# Patient Record
Sex: FEMALE | Race: ASIAN | NOT HISPANIC OR LATINO
[De-identification: names, ages, dates, MRNs, and addresses within clinical notes are randomized per-mention and may not be internally consistent; named-entity substitution may affect disease eponyms.]

---

## 2018-05-07 PROBLEM — Z00.00 ENCOUNTER FOR PREVENTIVE HEALTH EXAMINATION: Status: ACTIVE | Noted: 2018-05-07

## 2018-05-17 ENCOUNTER — APPOINTMENT (OUTPATIENT)
Dept: ORTHOPEDIC SURGERY | Facility: CLINIC | Age: 53
End: 2018-05-17
Payer: MEDICAID

## 2018-05-17 VITALS
HEART RATE: 76 BPM | BODY MASS INDEX: 31.65 KG/M2 | WEIGHT: 190 LBS | HEIGHT: 65 IN | SYSTOLIC BLOOD PRESSURE: 119 MMHG | DIASTOLIC BLOOD PRESSURE: 85 MMHG

## 2018-05-17 PROCEDURE — 99204 OFFICE O/P NEW MOD 45 MIN: CPT

## 2018-05-17 PROCEDURE — 72100 X-RAY EXAM L-S SPINE 2/3 VWS: CPT

## 2018-05-31 ENCOUNTER — TRANSCRIPTION ENCOUNTER (OUTPATIENT)
Age: 53
End: 2018-05-31

## 2018-06-13 ENCOUNTER — APPOINTMENT (OUTPATIENT)
Dept: ORTHOPEDIC SURGERY | Facility: CLINIC | Age: 53
End: 2018-06-13
Payer: MEDICAID

## 2018-06-13 DIAGNOSIS — Z78.9 OTHER SPECIFIED HEALTH STATUS: ICD-10-CM

## 2018-06-13 PROCEDURE — 99214 OFFICE O/P EST MOD 30 MIN: CPT

## 2018-07-11 ENCOUNTER — APPOINTMENT (OUTPATIENT)
Dept: SPINE | Facility: CLINIC | Age: 53
End: 2018-07-11
Payer: MEDICAID

## 2018-07-11 VITALS
SYSTOLIC BLOOD PRESSURE: 124 MMHG | BODY MASS INDEX: 30.53 KG/M2 | WEIGHT: 190 LBS | HEIGHT: 66 IN | DIASTOLIC BLOOD PRESSURE: 74 MMHG

## 2018-07-11 DIAGNOSIS — G95.19 OTHER VASCULAR MYELOPATHIES: ICD-10-CM

## 2018-07-11 PROCEDURE — 99203 OFFICE O/P NEW LOW 30 MIN: CPT

## 2018-07-11 RX ORDER — METHYLPREDNISOLONE 4 MG/1
4 TABLET ORAL
Qty: 21 | Refills: 0 | Status: DISCONTINUED | COMMUNITY
Start: 2018-05-17 | End: 2018-07-11

## 2018-07-11 RX ORDER — NAPROXEN 500 MG/1
500 TABLET ORAL
Qty: 30 | Refills: 0 | Status: DISCONTINUED | COMMUNITY
Start: 2018-05-09 | End: 2018-07-11

## 2018-07-11 RX ORDER — FLUTICASONE PROPIONATE 50 UG/1
50 POWDER, METERED RESPIRATORY (INHALATION)
Qty: 60 | Refills: 0 | Status: DISCONTINUED | COMMUNITY
Start: 2018-01-02 | End: 2018-07-11

## 2018-07-11 RX ORDER — METHYLPREDNISOLONE 4 MG/1
4 TABLET ORAL
Qty: 1 | Refills: 1 | Status: DISCONTINUED | COMMUNITY
Start: 2018-05-17 | End: 2018-07-11

## 2018-07-11 RX ORDER — IBUPROFEN 200 MG/1
TABLET, COATED ORAL
Refills: 0 | Status: ACTIVE | COMMUNITY

## 2018-07-11 RX ORDER — ACETAMINOPHEN 325 MG/1
TABLET, FILM COATED ORAL
Refills: 0 | Status: ACTIVE | COMMUNITY

## 2018-07-11 RX ORDER — PROMETHAZINE HYDROCHLORIDE AND DEXTROMETHORPHAN HYDROBROMIDE ORAL SOLUTION 15; 6.25 MG/5ML; MG/5ML
6.25-15 SOLUTION ORAL
Qty: 118 | Refills: 0 | Status: DISCONTINUED | COMMUNITY
Start: 2018-01-02 | End: 2018-07-11

## 2020-12-15 ENCOUNTER — APPOINTMENT (OUTPATIENT)
Dept: NEUROLOGY | Facility: CLINIC | Age: 55
End: 2020-12-15
Payer: COMMERCIAL

## 2020-12-15 VITALS
WEIGHT: 190 LBS | DIASTOLIC BLOOD PRESSURE: 75 MMHG | HEART RATE: 78 BPM | SYSTOLIC BLOOD PRESSURE: 126 MMHG | BODY MASS INDEX: 30.53 KG/M2 | HEIGHT: 66 IN

## 2020-12-15 DIAGNOSIS — U07.1 COVID-19: ICD-10-CM

## 2020-12-15 DIAGNOSIS — R51.9 HEADACHE, UNSPECIFIED: ICD-10-CM

## 2020-12-15 PROCEDURE — 99205 OFFICE O/P NEW HI 60 MIN: CPT

## 2020-12-15 PROCEDURE — 99072 ADDL SUPL MATRL&STAF TM PHE: CPT

## 2020-12-15 RX ORDER — AMOXICILLIN 500 MG/1
500 CAPSULE ORAL
Qty: 30 | Refills: 0 | Status: COMPLETED | COMMUNITY
Start: 2020-11-21

## 2020-12-15 RX ORDER — NAPROXEN 375 MG/1
375 TABLET ORAL
Qty: 60 | Refills: 0 | Status: ACTIVE | COMMUNITY
Start: 2020-11-21

## 2020-12-15 RX ORDER — CIPROFLOXACIN HYDROCHLORIDE 500 MG/1
500 TABLET, FILM COATED ORAL
Qty: 20 | Refills: 0 | Status: DISCONTINUED | COMMUNITY
Start: 2020-12-05

## 2020-12-15 RX ORDER — CYCLOBENZAPRINE HYDROCHLORIDE 5 MG/1
5 TABLET, FILM COATED ORAL
Qty: 60 | Refills: 1 | Status: ACTIVE | COMMUNITY
Start: 2020-12-15 | End: 1900-01-01

## 2020-12-15 RX ORDER — AZITHROMYCIN 250 MG/1
250 TABLET, FILM COATED ORAL
Qty: 6 | Refills: 0 | Status: COMPLETED | COMMUNITY
Start: 2020-12-10

## 2020-12-15 RX ORDER — CETIRIZINE HYDROCHLORIDE 10 MG/1
10 TABLET, COATED ORAL
Qty: 30 | Refills: 0 | Status: DISCONTINUED | COMMUNITY
Start: 2020-11-21

## 2020-12-15 NOTE — ASSESSMENT
[FreeTextEntry1] : Assessment/Plan:\par  55 year female presents with new onset headaches since being diagnosed with COVID-19 infection 1 month ago. She denies any vision changes, or body weakness or numbness. CT head in ED 12/9 was normal. Her headaches have been improving on medrol dose pack. Neurological exam normal. \par \par I suspect she has a post viral headaches. Presentation not consistent with migraines or meningitis. \par \par [] Will order MRI brain w.w.o to rule secondary causes of new onset headache\par [] Will prescribe flexeril 5-10 mg as needed at bedtime, for muscular component of HA\par [] Continue symptomatic management of HA with OTC pain medications\par \par Headache education provided:\par [] Stay well hydrated\par [] Limit excessive caffeine and alcohol intake\par [] Maintain good sleep hygiene\par [] Try to avoid triggers\par [] Practice good eating habits\par [] Avoid excessive use of over the counter pain medications, as they can cause medication overuse headaches \par [] Keep a headache diary\par \par \par RTC 3 weeks\par \par The above plan was discussed with BEATRIZ SMITH in great detail. BEATRIZ SMITH was provided education and counselling on current diagnosis/symptoms, diagnostic work up, treatment options and potential side effects of prescribed therapy/therapies. She was advised to call our clinic at [phone] for any new or worsening symptoms, or with any questions or concerns. BEATRIZ LUIS expressed understanding and all her questions were answered.\par

## 2020-12-15 NOTE — HISTORY OF PRESENT ILLNESS
[FreeTextEntry1] : Rosalia reports she was diagnosed with COVID-19 1 month ago, re tested 12/9 and was negative. \par \par She is accompanied by her daughter in clinic.\par \par She says she has been having headaches since COVID-19. She reports pressure headache and "head feeling heavy" and like a "head cold". She denies any prior hx of headaches. \par \par She reports the headaches can get severe. She was seen in ED 12/9 for her headaches at [facility]. CT head was normal. CT chest showed atypical viral pneumonia. \par \par She denies any vision changes, diplopia, weakness or numbness/paresthesias. \par \par She denies any nausea or light sensitivity. She denies any confusion. No seizures. \par \par She denies any fevers. She reports her shoulder muscles and neck muscles feel a little tense. \par \par She also endorses poor sleep.\par \par She was started on medrol dose pack 6 days ago and reports symptoms have improved,, less often and less intense.\par \par

## 2020-12-16 ENCOUNTER — APPOINTMENT (OUTPATIENT)
Dept: NEUROLOGY | Facility: CLINIC | Age: 55
End: 2020-12-16

## 2021-01-14 ENCOUNTER — APPOINTMENT (OUTPATIENT)
Dept: NEUROLOGY | Facility: CLINIC | Age: 56
End: 2021-01-14

## 2021-05-10 ENCOUNTER — APPOINTMENT (OUTPATIENT)
Dept: DISASTER EMERGENCY | Facility: OTHER | Age: 56
End: 2021-05-10
Payer: COMMERCIAL

## 2021-05-10 PROCEDURE — 0012A: CPT

## 2021-09-13 ENCOUNTER — TRANSCRIPTION ENCOUNTER (OUTPATIENT)
Age: 56
End: 2021-09-13

## 2021-10-06 PROBLEM — G95.19 NEUROGENIC CLAUDICATION: Status: ACTIVE | Noted: 2018-07-11

## 2022-02-15 ENCOUNTER — APPOINTMENT (OUTPATIENT)
Dept: ORTHOPEDIC SURGERY | Facility: CLINIC | Age: 57
End: 2022-02-15

## 2022-02-23 ENCOUNTER — APPOINTMENT (OUTPATIENT)
Dept: ORTHOPEDIC SURGERY | Facility: CLINIC | Age: 57
End: 2022-02-23
Payer: OTHER MISCELLANEOUS

## 2022-02-23 VITALS
OXYGEN SATURATION: 98 % | BODY MASS INDEX: 29.25 KG/M2 | HEART RATE: 76 BPM | WEIGHT: 182 LBS | HEIGHT: 66 IN | DIASTOLIC BLOOD PRESSURE: 85 MMHG | SYSTOLIC BLOOD PRESSURE: 121 MMHG

## 2022-02-23 DIAGNOSIS — M47.812 SPONDYLOSIS W/OUT MYELOPATHY OR RADICULOPATHY, CERVICAL REGION: ICD-10-CM

## 2022-02-23 PROCEDURE — 99204 OFFICE O/P NEW MOD 45 MIN: CPT

## 2022-02-23 PROCEDURE — 72040 X-RAY EXAM NECK SPINE 2-3 VW: CPT

## 2022-02-23 PROCEDURE — 73030 X-RAY EXAM OF SHOULDER: CPT | Mod: RT

## 2022-02-23 RX ORDER — CYCLOBENZAPRINE HYDROCHLORIDE 5 MG/1
5 TABLET, FILM COATED ORAL
Qty: 28 | Refills: 0 | Status: ACTIVE | COMMUNITY
Start: 2022-02-23 | End: 1900-01-01

## 2022-02-23 RX ORDER — PREDNISONE 50 MG/1
50 TABLET ORAL DAILY
Qty: 5 | Refills: 0 | Status: ACTIVE | COMMUNITY
Start: 2022-02-23 | End: 1900-01-01

## 2022-03-06 ENCOUNTER — FORM ENCOUNTER (OUTPATIENT)
Age: 57
End: 2022-03-06

## 2022-03-08 ENCOUNTER — OUTPATIENT (OUTPATIENT)
Dept: OUTPATIENT SERVICES | Facility: HOSPITAL | Age: 57
LOS: 1 days | End: 2022-03-08
Payer: SELF-PAY

## 2022-03-08 ENCOUNTER — APPOINTMENT (OUTPATIENT)
Dept: MRI IMAGING | Facility: CLINIC | Age: 57
End: 2022-03-08
Payer: SELF-PAY

## 2022-03-08 DIAGNOSIS — M47.812 SPONDYLOSIS WITHOUT MYELOPATHY OR RADICULOPATHY, CERVICAL REGION: ICD-10-CM

## 2022-03-08 DIAGNOSIS — R51.9 HEADACHE, UNSPECIFIED: ICD-10-CM

## 2022-03-08 PROCEDURE — 72141 MRI NECK SPINE W/O DYE: CPT | Mod: 26

## 2022-03-08 PROCEDURE — 72141 MRI NECK SPINE W/O DYE: CPT

## 2022-03-14 ENCOUNTER — APPOINTMENT (OUTPATIENT)
Dept: ORTHOPEDIC SURGERY | Facility: CLINIC | Age: 57
End: 2022-03-14
Payer: COMMERCIAL

## 2022-03-14 VITALS
SYSTOLIC BLOOD PRESSURE: 118 MMHG | HEIGHT: 66 IN | DIASTOLIC BLOOD PRESSURE: 80 MMHG | WEIGHT: 182 LBS | BODY MASS INDEX: 29.25 KG/M2

## 2022-03-14 PROCEDURE — 72110 X-RAY EXAM L-2 SPINE 4/>VWS: CPT

## 2022-03-14 PROCEDURE — 99214 OFFICE O/P EST MOD 30 MIN: CPT

## 2022-03-14 NOTE — ASSESSMENT
[FreeTextEntry1] : I had a lengthy discussion with the patient in regards to their treatment plan and diagnosis.  Their symptoms have persisted despite the conservative management they have attempted thus far.  As a result I would like to proceed with a lumbar MRI.  In tandem with this they should begin physical therapy/home therapy program.  The patient can take Tylenol/NSAIDs as needed for pain control if medically able to.  I will have the patient follow-up in 3 to 4 weeks for repeat clinical evaluation.  I encouraged them to follow-up sooner if their symptoms worsen or change in any way.\par \par The patient has tried the following treatments:\par Activity modification          +\par Ice/Compression                  +\par Braces                                     -\par NSAIDS                                   +\par Physical Therapy                   +\par \par Please note above modalities have been tried for over 6+ weeks.

## 2022-03-14 NOTE — HISTORY OF PRESENT ILLNESS
[de-identified] : 57 yo female presents with 5 year history of diffuse low back pain with bilateral radicular symptoms along the lateral aspects of both her thighs down to her ankles. She states the right side is worse than the left. Patient states she has been evaluated by multiple doctors and has done physical therapy, and 3 epidural spinal injections, last injection was over 3 years ago. She states the therapy and injections have not helped her. Her last MRI was  a year ago. She states she has the most discomfort when she sits. Her pain improves when she walks or lays down. She also takes Tylenol which also seems to help her as well. She denies any bowel or bladder dysfunction or saddle anesthesia.

## 2022-03-14 NOTE — PHYSICAL EXAM
[de-identified] : Lumbar Physical Exam\par \par Gait - Normal\par \par Station - Normal\par \par Sagittal balance - Normal\par \par Compensatory mechanism? - None\par \par Heel walk - Normal\par \par Toe walk - Normal\par \par Reflexes\par Patellar - normal\par Gastroc - normal\par Clonus - No\par \par Hip Exam - Normal\par \par Straight leg raise - none\par \par Pulses - 2+ dp/pt\par \par Range of motion - normal\par \par Sensation \par Sensation intact to light touch in L1, L2, L3, L4, L5 and S1 dermatomes bilaterally\par \par Motor\par 	IP	Quad	HS	TA	Gastroc	EHL\par Right	5/5	5/5	5/5	5/5	5/5	5/5\par Left	5/5	5/5	5/5	5/5	5/5	5/5 [de-identified] : Lumbar radiographs\par Facet arthropathy noted\par Disc degeneration noted

## 2022-03-15 ENCOUNTER — FORM ENCOUNTER (OUTPATIENT)
Age: 57
End: 2022-03-15

## 2022-03-21 ENCOUNTER — APPOINTMENT (OUTPATIENT)
Dept: ORTHOPEDIC SURGERY | Facility: CLINIC | Age: 57
End: 2022-03-21
Payer: OTHER MISCELLANEOUS

## 2022-03-21 VITALS
WEIGHT: 180 LBS | BODY MASS INDEX: 28.93 KG/M2 | HEART RATE: 69 BPM | OXYGEN SATURATION: 100 % | DIASTOLIC BLOOD PRESSURE: 80 MMHG | HEIGHT: 66 IN | SYSTOLIC BLOOD PRESSURE: 120 MMHG

## 2022-03-21 PROCEDURE — 99214 OFFICE O/P EST MOD 30 MIN: CPT | Mod: 25

## 2022-03-21 PROCEDURE — 20610 DRAIN/INJ JOINT/BURSA W/O US: CPT | Mod: RT

## 2022-03-21 PROCEDURE — 99072 ADDL SUPL MATRL&STAF TM PHE: CPT

## 2022-03-21 RX ORDER — MELOXICAM 15 MG/1
15 TABLET ORAL DAILY
Qty: 14 | Refills: 1 | Status: ACTIVE | COMMUNITY
Start: 2022-03-21 | End: 1900-01-01

## 2022-03-22 ENCOUNTER — OUTPATIENT (OUTPATIENT)
Dept: OUTPATIENT SERVICES | Facility: HOSPITAL | Age: 57
LOS: 1 days | End: 2022-03-22
Payer: COMMERCIAL

## 2022-03-22 ENCOUNTER — APPOINTMENT (OUTPATIENT)
Dept: MRI IMAGING | Facility: CLINIC | Age: 57
End: 2022-03-22
Payer: COMMERCIAL

## 2022-03-22 DIAGNOSIS — M54.16 RADICULOPATHY, LUMBAR REGION: ICD-10-CM

## 2022-03-22 DIAGNOSIS — Z00.8 ENCOUNTER FOR OTHER GENERAL EXAMINATION: ICD-10-CM

## 2022-03-22 PROCEDURE — 72148 MRI LUMBAR SPINE W/O DYE: CPT | Mod: 26

## 2022-03-22 PROCEDURE — 72148 MRI LUMBAR SPINE W/O DYE: CPT

## 2022-04-05 ENCOUNTER — APPOINTMENT (OUTPATIENT)
Dept: ORTHOPEDIC SURGERY | Facility: CLINIC | Age: 57
End: 2022-04-05
Payer: OTHER MISCELLANEOUS

## 2022-04-05 PROCEDURE — 99072 ADDL SUPL MATRL&STAF TM PHE: CPT

## 2022-04-05 PROCEDURE — 99214 OFFICE O/P EST MOD 30 MIN: CPT

## 2022-04-05 PROCEDURE — 73020 X-RAY EXAM OF SHOULDER: CPT | Mod: RT

## 2022-04-05 RX ORDER — DICLOFENAC SODIUM 75 MG/1
75 TABLET, DELAYED RELEASE ORAL
Qty: 60 | Refills: 1 | Status: COMPLETED | COMMUNITY
Start: 2022-04-05 | End: 2022-06-04

## 2022-04-05 RX ORDER — CYCLOBENZAPRINE HYDROCHLORIDE 5 MG/1
5 TABLET, FILM COATED ORAL
Qty: 20 | Refills: 0 | Status: COMPLETED | COMMUNITY
Start: 2022-04-05 | End: 2022-04-25

## 2022-04-05 NOTE — PHYSICAL EXAM
[de-identified] : Physical Examination\par General: well nourished, in no acute distress, alert and oriented to person, place and time\par Psychiatric: normal mood and affect, no abnormal movements or speech patterns\par Eyes: vision intact without glasses\par \par Musculoskeletal Examination\par Cervical spine	Full painless range of motion and negative Spurling's test\par \par Shoulder			Right			Left\par Appearance\par      Skin/Swelling/Deformity	prominent SC joint			normal\par      Scapular Winging		-			-\par Range of Motion\par      Forward Flexion		120 / 150		170 / 170\par      Abduction			70 / 120		170 / 170\par      External Rotation		60			60\par      Internal Rotation		lateral hip			T10\par      SAbd Ext Rotation		70			90\par      SAbd Int Rotation		40			80\par      Painful Arc			+			-\par      Crepitus			-			-\par Palpation\par      Clavicle			+ medial SC joint			-\par      AC Joint			-			-\par      Posterior Acromion		+			-\par      Levator Scapula		+			-\par      Lateral Bursa			+			-\par      Impingement Area		+			-\par      Biceps Tendon		+			-\par      Anterior Capsule		+			-\par right chest wall\par Strength Examination\par      Supraspinatous 		4+ / +			5+ / 0\par      Infraspinatous			4+ / +			5+ / 0\par      Subscapularis			5+ / 0			5+ / 0\par      Belly Press			5+ / 0			5+ / 0\par      Lift Off			-			-\par      Drop-Arm			-			-\par Special Examination\par      Biceps [place]'s		+			-\par      Impingement Neer		+			-\par      Impingement Hawking		+			-\par \par Sensation\par      Axillary			normal			normal\par      LatAntCubBrach 		normal			normal\par      Median 			normal			normal\par      Ulnar 			normal			normal\par      Radial 			normal			normal\par Motor\par      AIN 				normal			normal\par      Ulnar 			normal			normal\par      Radial 			normal			normal\par      PIN 				normal			normal\par Pulses\par      Radial			2+			2+\par  [de-identified] : 3 views of the affected XXX shoulder Glenoid, Y-View, Axillary)\par Dated 2-23-22 and evaluated by myself today\par 1  views of the affected XXX shoulder (AP)\par were ordered, obtained and evaluated by myself today and\par demonstrate:\par normal bony calcification without dislocation and no fracture\par 	Arch	[2B]\par 	AC Joint	[No] Arthrosis but appears wide\par 	GH Joint	[No] Arthrosis\par 	Calcifications	[none]\par \par

## 2022-04-05 NOTE — ASSESSMENT
[Indicate if, in your opinion, the incident that the patient described was the competent medical cause of this injury/illness.] : The incident that the patient described was the competent medical cause of this injury/illness: Yes [Indicate if the patient's complaints are consistent with his/her history of the injury/illness.] : Indicate if the patient's complaints are consistent with his/her history of the injury/illness: Yes [Yes] : Yes, it is consistent [Physical Disability Temporary Total] : temporarily total disabled [Can the patient return to usual work activities as indicated? If yes, indicate date___] : The patient cannot return to usual work activities as indicated. [FreeTextEntry5] : 100

## 2022-04-05 NOTE — DISCUSSION/SUMMARY
[de-identified] : right shoulder rotator cuff syndrome\par \par I discussed my findings on history, exam and radiology.\par \par I reviewed the anatomy and function of the shoulder rotator cuff muscles and tendons, biceps tendon and labrum.Given the current findings for the patient, I recommend proceeding with advanced imaging to better characterize and diagnose the problem to aid patient care, management and treatment guidance as I suspect an internal injury to the knee not diagnosed on non-diagnostic plain radiographs causing the patients symptoms and physical examination to help provide surgical management planning.\par \par Therefore given the patients continued symptoms despite [ Non-operative management ] the patient has continued pain and weakness and impaired sleep affecting the patient's ADLs and limiting activities negatively affecting the patient's quality of life, examination and history consistent with internal shoulder derangement or rotator cuff injury with weakness of the rotator cuff muscles along with severe pain I am recommending obtaining advanced MRI imaging of the shoulder to identify damaged structures in order to facilite preopertive planning. I discussed with the patient that I am ordering an MRI to assess the soft tissue structures in the joint such as the joint capsule, ligaments, rotator cuff and biceps tendons, as well as to assess the cartilage, muscle bellies, cysts, AC joint edema or other pathology. The test will need insurance approval and will take place at a [facility] or outside facility. If the patient elects to obtain the MRI from an outside facility, the patient understands they have been instructed to bring in both the final radiologist read and a CD/DVD with the MRI images to allow review of the imaging test by myself during the follow up visit. I do not recommend obtaining an open standing MRI as the quality of the images is subpar and makes it difficult to diagnose intra-articular derangements and guide care discussion and decision making.\par \par The patient was prescribed Diclofenac PO non-steroidal anti-inflammatory medication. 50mg tablets twice daily to be taken for at least 1-2 weeks in a row and then PRN afterwards. Risks and benefits were discussed and include but not limited to renal damage and GI ulceration and bleeding.  They were advised to take with food to limit stomach upset as well as warned to stop the medication if worsening gastric pain or dizziness or other side effects. Also to immediately stop the medication and seek appropriate medical attention if any severe stomach ache, gastritis, black/red vomit, black/red stools or any other medical concern.\par \par The patient verifies their understanding the the visit, diagnosis and plan. They agree with the treatment plan and will contact the office with any questions or problems.\par \par FU after MRI completed

## 2022-04-05 NOTE — HISTORY OF PRESENT ILLNESS
[Has the patient missed work because of the injury/illness?] : The patient has missed work because of the injury/illness. [No] : The patient is currently not working. [de-identified] : CC RIGHTLEFT shoulder\par \par HPI 56 y RHF presents with acute onset 6 weeks pain sc and lateral and neck and posterior right shoulder lifting heavy object. \par The pain is WORSE, and rated a 10 out of 10, described as severe WITH radiation\par nothing makes the pain better and everything makes the pain worse.\par The patient reports associated symptoms of hand tingling\par The patient DENIES pain at night affecting sleep, DENIES neck pain, and DENIES similar pain previously.\par \par The patient has tried the following treatments:\par Activity modification	+\par Ice			+\par Nsaids    		+\par Physical Therapy  	-\par Cortisone Injection	+ subacromial 3-21-22 several days help\par Arthroscopy/Surgery	-\par \par Review of Systems is positive for the above musculoskeletal symptoms and is otherwise non-contributory for general, constitutional, psychiatric, neurologic, HEENT, cardiac, respiratory, gastrointestinal, reproductive, lymphatic, and dermatologic complaints.\par \par Consult by Dr MARTINEZ\par \par  [FreeTextEntry1] : pain and limited motion and strength 2 mo after lifting heavy object [FreeTextEntry2] : HHA began early feb [FreeTextEntry3] : self care, cleaning, cooking [FreeTextEntry4] : CSI several days improvement 3-21-22 [FreeTextEntry6] : 2-23-22

## 2022-04-06 ENCOUNTER — FORM ENCOUNTER (OUTPATIENT)
Age: 57
End: 2022-04-06

## 2022-04-08 ENCOUNTER — NON-APPOINTMENT (OUTPATIENT)
Age: 57
End: 2022-04-08

## 2022-04-11 ENCOUNTER — APPOINTMENT (OUTPATIENT)
Dept: MRI IMAGING | Facility: CLINIC | Age: 57
End: 2022-04-11
Payer: OTHER MISCELLANEOUS

## 2022-04-11 ENCOUNTER — OUTPATIENT (OUTPATIENT)
Dept: OUTPATIENT SERVICES | Facility: HOSPITAL | Age: 57
LOS: 1 days | End: 2022-04-11
Payer: COMMERCIAL

## 2022-04-11 ENCOUNTER — APPOINTMENT (OUTPATIENT)
Dept: ORTHOPEDIC SURGERY | Facility: CLINIC | Age: 57
End: 2022-04-11
Payer: COMMERCIAL

## 2022-04-11 VITALS
HEIGHT: 66 IN | HEART RATE: 68 BPM | DIASTOLIC BLOOD PRESSURE: 87 MMHG | WEIGHT: 180 LBS | OXYGEN SATURATION: 98 % | BODY MASS INDEX: 28.93 KG/M2 | SYSTOLIC BLOOD PRESSURE: 129 MMHG

## 2022-04-11 DIAGNOSIS — Z00.8 ENCOUNTER FOR OTHER GENERAL EXAMINATION: ICD-10-CM

## 2022-04-11 DIAGNOSIS — M75.101 UNSPECIFIED ROTATOR CUFF TEAR OR RUPTURE OF RIGHT SHOULDER, NOT SPECIFIED AS TRAUMATIC: ICD-10-CM

## 2022-04-11 PROCEDURE — 73221 MRI JOINT UPR EXTREM W/O DYE: CPT | Mod: 26,RT

## 2022-04-11 PROCEDURE — 99214 OFFICE O/P EST MOD 30 MIN: CPT

## 2022-04-11 PROCEDURE — 73221 MRI JOINT UPR EXTREM W/O DYE: CPT

## 2022-04-11 NOTE — HISTORY OF PRESENT ILLNESS
[de-identified] : Today the patient states that she is still having back pain.  She also describes pain which does radiate down her right leg.  She denies any bowel bladder issues.  She denies any saddle anesthesia.  She does state that the symptoms can be disabling at times.

## 2022-04-11 NOTE — PHYSICAL EXAM
[de-identified] : Lumbar Physical Exam\par \par Gait - Normal\par \par Station - Normal\par \par Sagittal balance - Normal\par \par Compensatory mechanism? - None\par \par Heel walk - Normal\par \par Toe walk - Normal\par \par Reflexes\par Patellar - normal\par Gastroc - normal\par Clonus - No\par \par Hip Exam - Normal\par \par Straight leg raise - none\par \par Pulses - 2+ dp/pt\par \par Range of motion - normal\par \par Sensation \par Sensation intact to light touch in L1, L2, L3, L4, L5 and S1 dermatomes bilaterally\par \par Motor\par 	IP	Quad	HS	TA	Gastroc	EHL\par Right	5/5	5/5	5/5	5/5	5/5	5/5\par Left	5/5	5/5	5/5	5/5	5/5	5/5 [de-identified] : Lumbar radiographs\par Facet arthropathy noted\par Disc degeneration noted\par \par Lumbar MRI reviewed\par No areas of critical central stenosis\par No areas of critical foraminal stenosis

## 2022-05-10 ENCOUNTER — APPOINTMENT (OUTPATIENT)
Dept: ORTHOPEDIC SURGERY | Facility: CLINIC | Age: 57
End: 2022-05-10
Payer: OTHER MISCELLANEOUS

## 2022-05-10 VITALS — SYSTOLIC BLOOD PRESSURE: 114 MMHG | DIASTOLIC BLOOD PRESSURE: 56 MMHG | HEART RATE: 76 BPM

## 2022-05-10 PROCEDURE — 99214 OFFICE O/P EST MOD 30 MIN: CPT

## 2022-05-10 PROCEDURE — 99072 ADDL SUPL MATRL&STAF TM PHE: CPT

## 2022-05-10 RX ORDER — DICLOFENAC SODIUM 50 MG/1
50 TABLET, DELAYED RELEASE ORAL
Qty: 60 | Refills: 1 | Status: ACTIVE | COMMUNITY
Start: 2022-05-10 | End: 1900-01-01

## 2022-05-10 NOTE — PHYSICAL EXAM
[de-identified] : Physical Examination\par General: well nourished, in no acute distress, alert and oriented to person, place and time\par Psychiatric: normal mood and affect, no abnormal movements or speech patterns\par Eyes: vision intact without glasses\par \par Musculoskeletal Examination\par Cervical spine	Full painless range of motion and negative Spurling's test\par \par Shoulder			Right			Left\par Appearance\par      Skin/Swelling/Deformity	prominent SC joint			normal\par      Scapular Winging		-			-\par Range of Motion\par      Forward Flexion		120 / 150		170 / 170\par      Abduction			70 / 120		170 / 170\par      External Rotation		60			60\par      Internal Rotation		lateral hip			T10\par      SAbd Ext Rotation		70			90\par      SAbd Int Rotation		40			80\par      Painful Arc			+			-\par      Crepitus			-			-\par Palpation\par      Clavicle			+ medial SC joint			-\par      AC Joint			-			-\par      Posterior Acromion		+			-\par      Levator Scapula		+			-\par      Lateral Bursa			+			-\par      Impingement Area		+			-\par      Biceps Tendon		+			-\par      Anterior Capsule		+			-\par right chest wall\par Strength Examination\par      Supraspinatous 		4+ / +			5+ / 0\par      Infraspinatous			4+ / +			5+ / 0\par      Subscapularis			5+ / 0			5+ / 0\par      Belly Press			5+ / 0			5+ / 0\par      Lift Off			-			-\par      Drop-Arm			-			-\par Special Examination\par      Biceps [place]'s		+			-\par      Impingement Neer		+			-\par      Impingement Hawking		+			-\par \par Sensation\par      Axillary			normal			normal\par      LatAntCubBrach 		normal			normal\par      Median 			normal			normal\par      Ulnar 			normal			normal\par      Radial 			normal			normal\par Motor\par      AIN 				normal			normal\par      Ulnar 			normal			normal\par      Radial 			normal			normal\par      PIN 				normal			normal\par Pulses\par      Radial			2+			2+\par  [de-identified] : 3 views of the affected Binduer Glenoid, Y-View, Axillary)\par Dated 2-23-22 \par 1  views of the affected Pranaylder (AP)\par 4-5-22\par demonstrate:\par normal bony calcification without dislocation and no fracture\par 	Arch	[2B]\par 	AC Joint	[No] Arthrosis but appears wide\par 	GH Joint	[No] Arthrosis\par 	Calcifications	[none]\par \par \par MRI Left shoulder [facility] on 4-14-22\par My impression of the images:\par Quality of the MRI is good\par Supraspinatous Tendon mild signal\par Infraspinatous Tendon mild signal\par Subscapularis Tendon ok\par Teres Minor Tendon ok\par Muscle Belly Atrophy none\par Biceps Tendon is  in the groove and looks ok intra-articularly but poorly visualized with a stable attachment anchor\par Superior Labrum ok\par Anterior Labrum ok\par Posterior Labrum ok\par AC joint none\par There is no full thickness chondral lesion of the glenoid and humeral head\par \par The Final Radiologist Impression:\par LOCALIZER: No additional findings.\par \par Glenohumeral joint: There is cystic change in the humeral head at the infraspinatus insertion. There is no fracture or AVN. There is no substantial effusion.\par \par Acromioclavicular joint: There is widening of the acromioclavicular interval measuring 1 cm with partial attenuation of the AC joint ligaments consistent with partial tear. There is no significant adjacent soft tissue edema. There is associated cystic change at the AC joint. There is no disruption of the coracoclavicular ligament.\par \par Rotator cuff and bursae: There is mild/moderate supraspinatus and infraspinatus tendinosis. The teres minor and subscapularis tendons are intact. There is no asymmetric muscle atrophy. There is trace subacromial/subdeltoid bursal fluid.\par \par Biceps tendon and glenoid labrum: The biceps tendon is normal in appearance. The labrum is unremarkable.\par \par \par \par IMPRESSION:\par 1. Mild/moderate supraspinatus and infraspinatus tendinosis. No evidence of rotator cuff tear.\par \par 2. Chronic grade 1 acromioclavicular joint separation.\par \par 3. Trace subacromial/subdeltoid bursal fluid.

## 2022-05-10 NOTE — HISTORY OF PRESENT ILLNESS
[Has the patient missed work because of the injury/illness?] : The patient has missed work because of the injury/illness. [No] : The patient is currently not working. [de-identified] : CC LEFT shoulder\par \par HPI 56 y RHF presents for mri review of acute onset 6 weeks pain sc and lateral and neck and posterior right shoulder lifting heavy object. \par The pain is WORSE, and rated a 10 out of 10, described as severe WITH radiation\par nothing makes the pain better and everything makes the pain worse.\par The patient reports associated symptoms of hand tingling\par The patient DENIES pain at night affecting sleep, DENIES neck pain, and DENIES similar pain previously.\par \par The patient has tried the following treatments:\par Activity modification	+\par Ice			+\par Nsaids    		+\par Physical Therapy  	-\par Cortisone Injection	+ subacromial 3-21-22 several days help\par Arthroscopy/Surgery	-\par \par Review of Systems is positive for the above musculoskeletal symptoms and is otherwise non-contributory for general, constitutional, psychiatric, neurologic, HEENT, cardiac, respiratory, gastrointestinal, reproductive, lymphatic, and dermatologic complaints.\par \par Consult by Dr MARTINEZ\par \par  [FreeTextEntry1] : pain and limited motion and strength 2 mo after lifting heavy object [FreeTextEntry2] : HHA began early feb [FreeTextEntry3] : self care, cleaning, cooking [FreeTextEntry4] : CSI several days improvement 3-21-22 [FreeTextEntry6] : 2-23-22

## 2022-05-27 ENCOUNTER — APPOINTMENT (OUTPATIENT)
Dept: ORTHOPEDIC SURGERY | Facility: CLINIC | Age: 57
End: 2022-05-27
Payer: OTHER MISCELLANEOUS

## 2022-05-27 PROCEDURE — 99214 OFFICE O/P EST MOD 30 MIN: CPT

## 2022-05-27 PROCEDURE — 99072 ADDL SUPL MATRL&STAF TM PHE: CPT

## 2022-05-27 NOTE — PHYSICAL EXAM
[de-identified] : Physical Examination\par General: well nourished, in no acute distress, alert and oriented to person, place and time\par Psychiatric: normal mood and affect, no abnormal movements or speech patterns\par Eyes: vision intact without glasses\par \par Musculoskeletal Examination\par Cervical spine	Full painless range of motion and negative Spurling's test\par \par Shoulder			Right			Left\par Appearance\par      Skin/Swelling/Deformity	prominent SC joint			normal\par      Scapular Winging		-			-\par Range of Motion\par      Forward Flexion		150 / 150		170 / 170\par      Abduction			110 / 120		170 / 170\par      External Rotation		60			60\par      Internal Rotation		L5			T10\par      SAbd Ext Rotation		70			90\par      SAbd Int Rotation		40			80\par      Painful Arc			+			-\par      Crepitus			-			-\par Palpation\par      Clavicle			-			-\par      AC Joint			-			-\par      Posterior Acromion		-			-\par      Levator Scapula		-			-\par      Lateral Bursa			+			-\par      Impingement Area		-			-\par      Biceps Tendon		-			-\par      Anterior Capsule		-			-\par right chest wall\par Strength Examination\par      Supraspinatous 		5+ / +			5+ / 0\par      Infraspinatous			5+ / +			5+ / 0\par      Subscapularis			5+ / 0			5+ / 0\par      Belly Press			5+ / 0			5+ / 0\par      Lift Off			-			-\par      Drop-Arm			-			-\par Special Examination\par      Biceps [place]'s		+			-\par      Impingement Neer		+			-\par      Impingement Hawking		+			-\par \par Sensation\par      Axillary			normal			normal\par      LatAntCubBrach 		normal			normal\par      Median 			normal			normal\par      Ulnar 			normal			normal\par      Radial 			normal			normal\par Motor\par      AIN 				normal			normal\par      Ulnar 			normal			normal\par      Radial 			normal			normal\par      PIN 				normal			normal\par Pulses\par      Radial			2+			2+\par  [de-identified] : 3 views of the affected Binduer Glenoid, Y-View, Axillary)\par Dated 2-23-22 \par 1  views of the affected Pranaylder (AP)\par 4-5-22\par demonstrate:\par normal bony calcification without dislocation and no fracture\par 	Arch	[2B]\par 	AC Joint	[No] Arthrosis but appears wide\par 	GH Joint	[No] Arthrosis\par 	Calcifications	[none]\par \par \par MRI Left shoulder from [facility] on 4-14-22\par My impression of the images:\par Quality of the MRI is good\par Supraspinatous Tendon mild signal\par Infraspinatous Tendon mild signal\par Subscapularis Tendon ok\par Teres Minor Tendon ok\par Muscle Belly Atrophy none\par Biceps Tendon is  in the groove and looks ok intra-articularly but poorly visualized with a stable attachment anchor\par Superior Labrum ok\par Anterior Labrum ok\par Posterior Labrum ok\par AC joint none\par There is no full thickness chondral lesion of the glenoid and humeral head\par \par The Final Radiologist Impression:\par LOCALIZER: No additional findings.\par \par Glenohumeral joint: There is cystic change in the humeral head at the infraspinatus insertion. There is no fracture or AVN. There is no substantial effusion.\par \par Acromioclavicular joint: There is widening of the acromioclavicular interval measuring 1 cm with partial attenuation of the AC joint ligaments consistent with partial tear. There is no significant adjacent soft tissue edema. There is associated cystic change at the AC joint. There is no disruption of the coracoclavicular ligament.\par \par Rotator cuff and bursae: There is mild/moderate supraspinatus and infraspinatus tendinosis. The teres minor and subscapularis tendons are intact. There is no asymmetric muscle atrophy. There is trace subacromial/subdeltoid bursal fluid.\par \par Biceps tendon and glenoid labrum: The biceps tendon is normal in appearance. The labrum is unremarkable.\par \par \par \par IMPRESSION:\par 1. Mild/moderate supraspinatus and infraspinatus tendinosis. No evidence of rotator cuff tear.\par \par 2. Chronic grade 1 acromioclavicular joint separation.\par \par 3. Trace subacromial/subdeltoid bursal fluid.

## 2022-05-27 NOTE — DISCUSSION/SUMMARY
[de-identified] : right shoulder rotator cuff tendinosis secondary adhesive capsulitis\par \par PT\par \par diclofenac\par \par light duty

## 2022-05-27 NOTE — ASSESSMENT
[Indicate if, in your opinion, the incident that the patient described was the competent medical cause of this injury/illness.] : The incident that the patient described was the competent medical cause of this injury/illness: Yes [Indicate if the patient's complaints are consistent with his/her history of the injury/illness.] : Indicate if the patient's complaints are consistent with his/her history of the injury/illness: Yes [Yes] : Yes, it is consistent [Physical Disability Temporary Total] : temporarily total disabled [Can the patient return to usual work activities as indicated? If yes, indicate date___] : The patient cannot return to usual work activities as indicated. [Are there any work limitations? (If so, explain and quantify, including the anticipated duration of the limitations)] : There are work limitations. [FreeTextEntry5] : 100 [FreeTextEntry7] : no overhead actbity no wb great than 5 lbs

## 2022-05-27 NOTE — HISTORY OF PRESENT ILLNESS
[Has the patient missed work because of the injury/illness?] : The patient has missed work because of the injury/illness. [No] : The patient is currently not working. [de-identified] : CC LEFT shoulder\par \par HPI 56 y RHF presents for fu review of acute onset 6 weeks pain sc and lateral and neck and posterior right shoulder lifting heavy object. \par The pain is WORSE, and rated a 10 out of 10, described as severe WITH radiation\par nothing makes the pain better and everything makes the pain worse.\par The patient reports associated symptoms of hand tingling\par The patient DENIES pain at night affecting sleep, DENIES neck pain, and DENIES similar pain previously.\par \par The patient has tried the following treatments:\par Activity modification	+\par Ice			+\par Nsaids    		+\par Physical Therapy  	-\par Cortisone Injection	+ subacromial 3-21-22 several days help\par Arthroscopy/Surgery	-\par \par \par no pt, at wedding, reports needs new pt script\par Review of Systems is positive for the above musculoskeletal symptoms and is otherwise non-contributory for general, constitutional, psychiatric, neurologic, HEENT, cardiac, respiratory, gastrointestinal, reproductive, lymphatic, and dermatologic complaints.\par \par Consult by Dr MARTINEZ\par \par  [FreeTextEntry1] : pain and limited motion and strength 2 mo after lifting heavy object [FreeTextEntry2] : HHA began early feb [FreeTextEntry3] : self care, cleaning, cooking [FreeTextEntry4] : CSI several days improvement 3-21-22 [FreeTextEntry6] : 2-23-22

## 2022-05-30 ENCOUNTER — FORM ENCOUNTER (OUTPATIENT)
Age: 57
End: 2022-05-30

## 2022-06-16 ENCOUNTER — FORM ENCOUNTER (OUTPATIENT)
Age: 57
End: 2022-06-16

## 2022-06-20 ENCOUNTER — APPOINTMENT (OUTPATIENT)
Dept: ORTHOPEDIC SURGERY | Facility: CLINIC | Age: 57
End: 2022-06-20
Payer: COMMERCIAL

## 2022-06-20 DIAGNOSIS — M47.816 SPONDYLOSIS W/OUT MYELOPATHY OR RADICULOPATHY, LUMBAR REGION: ICD-10-CM

## 2022-06-20 PROCEDURE — 99214 OFFICE O/P EST MOD 30 MIN: CPT

## 2022-06-20 NOTE — ASSESSMENT
[FreeTextEntry1] : I had a lengthy discussion with the patient in regards to treatment plan and diagnosis. There are no red flag findings on imaging nor are there any red flag findings on clinical exam.  Therefore we will proceed with a course of conservative treatment.  This would include physical therapy/home exercise program, Tylenol, NSAIDs as medically indicated.  The patient will follow up with me in approximately 6 to 8 weeks.  I encouraged the patient to follow-up sooner if there are any new or worsening symptoms.  Given the patient's persistent symptoms I would also encourage her to follow-up with pain management as an appropriate referral has been provided to her today.

## 2022-06-20 NOTE — HISTORY OF PRESENT ILLNESS
[de-identified] : Today the patient states that she is still dealing with focal low back pain.  She states that she is unable to sit or walk for any prolonged period.  She denies any bowel bladder issues.  She denies any saddle anesthesia.  She does have decreased walking tolerance and decreased sitting tolerance to the current symptoms.\par \par 04/11/22\par Today the patient states that she is still having back pain.  She also describes pain which does radiate down her right leg.  She denies any bowel bladder issues.  She denies any saddle anesthesia.  She does state that the symptoms can be disabling at times.

## 2022-06-20 NOTE — PHYSICAL EXAM
[de-identified] : Lumbar Physical Exam\par \par Gait - Normal\par \par Station - Normal\par \par Sagittal balance - Normal\par \par Compensatory mechanism? - None\par \par Heel walk - Normal\par \par Toe walk - Normal\par \par Reflexes\par Patellar - normal\par Gastroc - normal\par Clonus - No\par \par Hip Exam - Normal\par \par Straight leg raise - none\par \par Pulses - 2+ dp/pt\par \par Range of motion - normal\par \par Sensation \par Sensation intact to light touch in L1, L2, L3, L4, L5 and S1 dermatomes bilaterally\par \par Motor\par 	IP	Quad	HS	TA	Gastroc	EHL\par Right	5/5	5/5	5/5	5/5	5/5	5/5\par Left	5/5	5/5	5/5	5/5	5/5	5/5 [de-identified] : Lumbar radiographs\par Facet arthropathy noted\par Disc degeneration noted\par \par Lumbar MRI reviewed\par No areas of critical central stenosis\par No areas of critical foraminal stenosis

## 2022-07-08 ENCOUNTER — APPOINTMENT (OUTPATIENT)
Dept: ORTHOPEDIC SURGERY | Facility: CLINIC | Age: 57
End: 2022-07-08

## 2022-07-08 DIAGNOSIS — M75.01 ADHESIVE CAPSULITIS OF RIGHT SHOULDER: ICD-10-CM

## 2022-07-08 DIAGNOSIS — M67.911 UNSPECIFIED DISORDER OF SYNOVIUM AND TENDON, RIGHT SHOULDER: ICD-10-CM

## 2022-07-08 PROCEDURE — 99213 OFFICE O/P EST LOW 20 MIN: CPT

## 2022-07-08 PROCEDURE — 99072 ADDL SUPL MATRL&STAF TM PHE: CPT

## 2022-07-08 RX ORDER — DICLOFENAC SODIUM 50 MG/1
50 TABLET, DELAYED RELEASE ORAL
Qty: 60 | Refills: 1 | Status: ACTIVE | COMMUNITY
Start: 2022-07-08 | End: 1900-01-01

## 2022-07-08 NOTE — DISCUSSION/SUMMARY
[de-identified] : right shoulder rotator cuff tendinosis secondary adhesive capsulitis\par \par PT\par \par diclofenac\par \par light duty continue\par \par discussed departure\par \par fu 2 mo

## 2022-07-08 NOTE — ASSESSMENT
[Indicate if, in your opinion, the incident that the patient described was the competent medical cause of this injury/illness.] : The incident that the patient described was the competent medical cause of this injury/illness: Yes [Indicate if the patient's complaints are consistent with his/her history of the injury/illness.] : Indicate if the patient's complaints are consistent with his/her history of the injury/illness: Yes [Yes] : Yes, it is consistent [Are there any work limitations? (If so, explain and quantify, including the anticipated duration of the limitations)] : There are work limitations. [Physical Disability Temporary Partial] : temporarily partial disabled [Can the patient return to usual work activities as indicated? If yes, indicate date___] : The patient cannot return to usual work activities as indicated. [FreeTextEntry5] : 80 [FreeTextEntry7] : no overhead activity no wb great than 5 lbs

## 2022-07-08 NOTE — PHYSICAL EXAM
[de-identified] : Physical Examination\par General: well nourished, in no acute distress, alert and oriented to person, place and time\par Psychiatric: normal mood and affect, no abnormal movements or speech patterns\par Eyes: vision intact without glasses\par \par Musculoskeletal Examination\par Cervical spine	Full painless range of motion and negative Spurling's test\par \par Shoulder			Right			Left\par Appearance\par      Skin/Swelling/Deformity	prominent SC joint			normal\par      Scapular Winging		-			-\par Range of Motion\par      Forward Flexion		120 / 120		170 / 170\par      Abduction			70 / 70		170 / 170\par      External Rotation		30			80\par      Internal Rotation		post hip			T10\par      SAbd Ext Rotation		70&			90\par      SAbd Int Rotation		40&			80\par      Painful Arc			+			-\par      Crepitus			-			-\par Palpation\par      Clavicle			-			-\par      AC Joint			-			-\par      Posterior Acromion		-			-\par      Levator Scapula		-			-\par      Lateral Bursa			+			-\par      Impingement Area		-			-\par      Biceps Tendon		-			-\par      Anterior Capsule		-			-\par right chest wall\par Strength Examination\par      Supraspinatous 		5+ / +			5+ / 0\par      Infraspinatous			5+ / +			5+ / 0\par      Subscapularis			5+ / +			5+ / 0\par      Belly Press			5+ / +			5+ / 0\par      Lift Off			-&			-\par      Drop-Arm			-			-\par Special Examination\par      Biceps Elko's		+&			-\par      Impingement Neer		+&			-\par      Impingement Hawking		+&			-\par \par Sensation\par      Axillary			normal			normal\par      LatAntCubBrach 		normal			normal\par      Median 			normal			normal\par      Ulnar 			normal			normal\par      Radial 			normal			normal\par Motor\par      AIN 				normal			normal\par      Ulnar 			normal			normal\par      Radial 			normal			normal\par      PIN 				normal			normal\par Pulses\par      Radial			2+			2+\par  [de-identified] : 3 views of the affected Binduer Glenoid, Y-View, Axillary)\par Dated 2-23-22 \par 1  views of the affected Pranaylder (AP)\par 4-5-22\par demonstrate:\par normal bony calcification without dislocation and no fracture\par 	Arch	[2B]\par 	AC Joint	[No] Arthrosis but appears wide\par 	GH Joint	[No] Arthrosis\par 	Calcifications	[none]\par \par \par MRI Left shoulder from [facility] on 4-14-22\par My impression of the images:\par Quality of the MRI is good\par Supraspinatous Tendon mild signal\par Infraspinatous Tendon mild signal\par Subscapularis Tendon ok\par Teres Minor Tendon ok\par Muscle Belly Atrophy none\par Biceps Tendon is  in the groove and looks ok intra-articularly but poorly visualized with a stable attachment anchor\par Superior Labrum ok\par Anterior Labrum ok\par Posterior Labrum ok\par AC joint none\par There is no full thickness chondral lesion of the glenoid and humeral head\par \par The Final Radiologist Impression:\par LOCALIZER: No additional findings.\par \par Glenohumeral joint: There is cystic change in the humeral head at the infraspinatus insertion. There is no fracture or AVN. There is no substantial effusion.\par \par Acromioclavicular joint: There is widening of the acromioclavicular interval measuring 1 cm with partial attenuation of the AC joint ligaments consistent with partial tear. There is no significant adjacent soft tissue edema. There is associated cystic change at the AC joint. There is no disruption of the coracoclavicular ligament.\par \par Rotator cuff and bursae: There is mild/moderate supraspinatus and infraspinatus tendinosis. The teres minor and subscapularis tendons are intact. There is no asymmetric muscle atrophy. There is trace subacromial/subdeltoid bursal fluid.\par \par Biceps tendon and glenoid labrum: The biceps tendon is normal in appearance. The labrum is unremarkable.\par \par \par \par IMPRESSION:\par 1. Mild/moderate supraspinatus and infraspinatus tendinosis. No evidence of rotator cuff tear.\par \par 2. Chronic grade 1 acromioclavicular joint separation.\par \par 3. Trace subacromial/subdeltoid bursal fluid.

## 2022-08-31 ENCOUNTER — APPOINTMENT (OUTPATIENT)
Dept: ORTHOPEDIC SURGERY | Facility: CLINIC | Age: 57
End: 2022-08-31

## 2022-08-31 VITALS
WEIGHT: 180 LBS | SYSTOLIC BLOOD PRESSURE: 115 MMHG | DIASTOLIC BLOOD PRESSURE: 60 MMHG | BODY MASS INDEX: 28.93 KG/M2 | HEIGHT: 66 IN

## 2022-08-31 DIAGNOSIS — M54.16 RADICULOPATHY, LUMBAR REGION: ICD-10-CM

## 2022-08-31 PROCEDURE — 99214 OFFICE O/P EST MOD 30 MIN: CPT

## 2022-08-31 NOTE — HISTORY OF PRESENT ILLNESS
[de-identified] : Today the patient states that she is still dealing with some very severe low back pain.  It is interfering with her quality of life and ability to perform her activities of daily living.  She denies any bowel bladder issues.  She denies any saddle anesthesia.  She does state that the symptoms can be disabling.\par \par 06/20/22\par Today the patient states that she is still dealing with focal low back pain.  She states that she is unable to sit or walk for any prolonged period.  She denies any bowel bladder issues.  She denies any saddle anesthesia.  She does have decreased walking tolerance and decreased sitting tolerance to the current symptoms.\par \par 04/11/22\par Today the patient states that she is still having back pain.  She also describes pain which does radiate down her right leg.  She denies any bowel bladder issues.  She denies any saddle anesthesia.  She does state that the symptoms can be disabling at times.

## 2022-08-31 NOTE — PHYSICAL EXAM
[de-identified] : Lumbar Physical Exam\par \par Gait - Normal\par \par Station - Normal\par \par Sagittal balance - Normal\par \par Compensatory mechanism? - None\par \par Heel walk - Normal\par \par Toe walk - Normal\par \par Reflexes\par Patellar - normal\par Gastroc - normal\par Clonus - No\par \par Hip Exam - Normal\par \par Straight leg raise - none\par \par Pulses - 2+ dp/pt\par \par Range of motion - normal\par \par Sensation \par Sensation intact to light touch in L1, L2, L3, L4, L5 and S1 dermatomes bilaterally\par \par Motor\par 	IP	Quad	HS	TA	Gastroc	EHL\par Right	5/5	5/5	5/5	5/5	5/5	5/5\par Left	5/5	5/5	5/5	5/5	5/5	5/5 [de-identified] : Lumbar radiographs\par Facet arthropathy noted\par Disc degeneration noted\par \par Lumbar MRI reviewed\par No areas of critical central stenosis\par No areas of critical foraminal stenosis

## 2022-08-31 NOTE — ASSESSMENT
[FreeTextEntry1] : I had a lengthy discussion with the patient in regards to treatment plan and diagnosis. There are no red flag findings on imaging nor are there any red flag findings on clinical exam.  Therefore we will proceed with a course of conservative treatment.  This would include physical therapy/home exercise program, Tylenol, NSAIDs as medically indicated.  The patient will follow up with me in approximately 6 to 8 weeks.  I encouraged the patient to follow-up sooner if there are any new or worsening symptoms.  I will also provide her a rheumatology referral as her symptoms are somewhat diffuse and there may be a component of diagnoses like inflammatory arthropathy.

## 2022-09-28 ENCOUNTER — APPOINTMENT (OUTPATIENT)
Dept: ORTHOPEDIC SURGERY | Facility: CLINIC | Age: 57
End: 2022-09-28

## 2024-06-16 ENCOUNTER — EMERGENCY (EMERGENCY)
Facility: HOSPITAL | Age: 59
LOS: 1 days | Discharge: HOME | End: 2024-06-16
Attending: EMERGENCY MEDICINE | Admitting: EMERGENCY MEDICINE
Payer: SELF-PAY

## 2024-06-16 VITALS
RESPIRATION RATE: 16 BRPM | WEIGHT: 190.04 LBS | TEMPERATURE: 98 F | SYSTOLIC BLOOD PRESSURE: 131 MMHG | OXYGEN SATURATION: 99 % | HEIGHT: 66 IN | DIASTOLIC BLOOD PRESSURE: 85 MMHG | HEART RATE: 73 BPM

## 2024-06-16 VITALS
HEART RATE: 69 BPM | OXYGEN SATURATION: 99 % | SYSTOLIC BLOOD PRESSURE: 118 MMHG | TEMPERATURE: 98 F | DIASTOLIC BLOOD PRESSURE: 83 MMHG | RESPIRATION RATE: 18 BRPM

## 2024-06-16 PROCEDURE — 72040 X-RAY EXAM NECK SPINE 2-3 VW: CPT

## 2024-06-16 PROCEDURE — 99284 EMERGENCY DEPT VISIT MOD MDM: CPT

## 2024-06-16 PROCEDURE — 96374 THER/PROPH/DIAG INJ IV PUSH: CPT

## 2024-06-16 PROCEDURE — 72040 X-RAY EXAM NECK SPINE 2-3 VW: CPT | Mod: 26

## 2024-06-16 PROCEDURE — 71045 X-RAY EXAM CHEST 1 VIEW: CPT

## 2024-06-16 PROCEDURE — 71045 X-RAY EXAM CHEST 1 VIEW: CPT | Mod: 26

## 2024-06-16 PROCEDURE — 93010 ELECTROCARDIOGRAM REPORT: CPT

## 2024-06-16 PROCEDURE — 93005 ELECTROCARDIOGRAM TRACING: CPT

## 2024-06-16 PROCEDURE — 99284 EMERGENCY DEPT VISIT MOD MDM: CPT | Mod: 25

## 2024-06-16 RX ORDER — OXYCODONE AND ACETAMINOPHEN 5; 325 MG/1; MG/1
1 TABLET ORAL
Qty: 15 | Refills: 0
Start: 2024-06-16 | End: 2024-06-20

## 2024-06-16 RX ORDER — KETOROLAC TROMETHAMINE 30 MG/ML
30 SYRINGE (ML) INJECTION ONCE
Refills: 0 | Status: DISCONTINUED | OUTPATIENT
Start: 2024-06-16 | End: 2024-06-16

## 2024-06-16 RX ORDER — CYCLOBENZAPRINE HYDROCHLORIDE 10 MG/1
1 TABLET, FILM COATED ORAL
Qty: 15 | Refills: 0
Start: 2024-06-16 | End: 2024-06-20

## 2024-06-16 RX ADMIN — Medication 30 MILLIGRAM(S): at 20:43
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
## 2024-06-16 NOTE — ED PROVIDER NOTE - NSFOLLOWUPINSTRUCTIONS_ED_ALL_ED_FT
Muscle Strain  A muscle strain, or pulled muscle, happens when a muscle is stretched beyond its normal length. This can tear some muscle fibers and cause pain.    Usually, it takes 1–2 weeks to heal from a muscle strain. Full healing normally takes 5–6 weeks.    What are the causes?  This condition is caused when a sudden force is placed on a muscle and stretches it too far. This can happen with a fall, while lifting, or during sports.    What increases the risk?  You are more likely to develop a muscle strain if you are an athlete or you do a lot of physical activity.    What are the signs or symptoms?  Pain.  Tenderness.  Bruising.  Swelling.  Trouble using the muscle.  How is this treated?  This condition is first treated with PRICE therapy. This involves:  Protecting your muscle from being injured again.  Resting your injured muscle.  Icing your injured muscle.  Putting pressure (compression) on your injured muscle. This may be done with a splint or elastic bandage.  Raising (elevating) your injured muscle.  Your doctor may also recommend medicine for pain.    Follow these instructions at home:  If you have a splint that can be taken off:    Wear the splint as told by your doctor. Take it off only as told by your doctor.  Check the skin around the splint every day. Tell your doctor if you see problems.  Loosen the splint if your fingers or toes:  Tingle.  Become numb.  Turn cold and blue.  Keep the splint clean.  If the splint is not waterproof:  Do not let it get wet.  Cover it with a watertight covering when you take a bath or a shower.  Managing pain, stiffness, and swelling    Bag of ice on a towel on the skin.   If told, put ice on your injured area. To do this:  If you have a removable splint, take it off as told by your doctor.  Put ice in a plastic bag.  Place a towel between your skin and the bag.  Leave the ice on for 20 minutes, 2–3 times a day.  Take off the ice if your skin turns bright red. This is very important. If you cannot feel pain, heat, or cold, you have a greater risk of damage to the area.  Move your fingers or toes often.  Raise the injured area above the level of your heart while you are sitting or lying down.  Wear an elastic bandage as told by your doctor. Make sure it is not too tight.  General instructions    Take over-the-counter and prescription medicines only as told by your doctor. This may include:  Medicines for pain and swelling that are taken by mouth or put on the skin.  Medicines to help relax your muscles.  Limit your activity. Rest your injured muscle as told by your doctor. Your doctor may say that gentle movements are okay.  If physical therapy was prescribed, do exercises as told by your doctor.  Do not put pressure on any part of the splint until it is fully hardened. This may take many hours.  Do not smoke or use any products that contain nicotine or tobacco. If you need help quitting, ask your doctor.  Ask your doctor when it is safe to drive if you have a splint.  Keep all follow-up visits.  How is this prevented?  Warm up before you exercise. This helps to prevent more muscle strains.    Contact a doctor if:  You have more pain or swelling in the injured area.  Get help right away if:  You have any of these problems in your injured area:  Numbness.  Tingling.  Less strength than normal.  Summary  A muscle strain is an injury that happens when a muscle is stretched beyond normal length.  This condition is first treated with PRICE therapy. This includes protecting, resting, icing, adding pressure, and raising your injury.  Limit your activity. Rest your injured muscle as told by your doctor. Your doctor may say that gentle movements are okay.  Warm up before you exercise. This helps to prevent more muscle strains.  This information is not intended to replace advice given to you by your health care provider. Make sure you discuss any questions you have with your health care provider.    Document Revised: 04/22/2024 Document Reviewed: 03/07/2022  Elsevier Patient Education © 2024 [employer]

## 2024-06-16 NOTE — ED ADULT NURSE NOTE - NSFALLHARMRISKINTERV_ED_ALL_ED

## 2024-06-16 NOTE — ED PROVIDER NOTE - PATIENT PORTAL LINK FT
You can access the FollowMyHealth Patient Portal offered by [facility] by registering at the following website: [email or web address]. By joining [facility]’s FollowMyHealth portal, you will also be able to view your health information using other applications (apps) compatible with our system.

## 2024-06-16 NOTE — ED ADULT NURSE NOTE - NSFALLOOBATTEMPT_ED_ALL_ED
Per previous encounter, patient is seen GI specialist  Faxed over urine culture and urine cytology, confirmation received     Results placed ban in upcoming appts bin (July) No
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
